# Patient Record
Sex: MALE | Race: WHITE | NOT HISPANIC OR LATINO | URBAN - METROPOLITAN AREA
[De-identification: names, ages, dates, MRNs, and addresses within clinical notes are randomized per-mention and may not be internally consistent; named-entity substitution may affect disease eponyms.]

---

## 2018-08-15 ENCOUNTER — APPOINTMENT (RX ONLY)
Dept: URBAN - METROPOLITAN AREA CLINIC 63 | Facility: CLINIC | Age: 83
Setting detail: DERMATOLOGY
End: 2018-08-15

## 2018-08-15 DIAGNOSIS — L57.0 ACTINIC KERATOSIS: ICD-10-CM

## 2018-08-15 DIAGNOSIS — L70.8 OTHER ACNE: ICD-10-CM

## 2018-08-15 DIAGNOSIS — L82.1 OTHER SEBORRHEIC KERATOSIS: ICD-10-CM

## 2018-08-15 PROCEDURE — 17000 DESTRUCT PREMALG LESION: CPT

## 2018-08-15 PROCEDURE — ? COUNSELING

## 2018-08-15 PROCEDURE — 99213 OFFICE O/P EST LOW 20 MIN: CPT | Mod: 25

## 2018-08-15 PROCEDURE — 17003 DESTRUCT PREMALG LES 2-14: CPT

## 2018-08-15 PROCEDURE — ? LIQUID NITROGEN

## 2018-08-15 ASSESSMENT — LOCATION DETAILED DESCRIPTION DERM
LOCATION DETAILED: 3RD WEB SPACE RIGHT HAND
LOCATION DETAILED: RIGHT ULNAR DORSAL HAND
LOCATION DETAILED: RIGHT RADIAL DORSAL HAND
LOCATION DETAILED: 3RD WEB SPACE LEFT HAND
LOCATION DETAILED: LEFT ULNAR DORSAL HAND
LOCATION DETAILED: RIGHT CENTRAL POSTAURICULAR SKIN
LOCATION DETAILED: RIGHT SUPERIOR MEDIAL UPPER BACK
LOCATION DETAILED: LEFT DORSAL WRIST

## 2018-08-15 ASSESSMENT — LOCATION SIMPLE DESCRIPTION DERM
LOCATION SIMPLE: RIGHT UPPER BACK
LOCATION SIMPLE: LEFT WRIST
LOCATION SIMPLE: SCALP
LOCATION SIMPLE: LEFT HAND
LOCATION SIMPLE: RIGHT HAND

## 2018-08-15 ASSESSMENT — LOCATION ZONE DERM
LOCATION ZONE: ARM
LOCATION ZONE: HAND
LOCATION ZONE: TRUNK
LOCATION ZONE: SCALP

## 2018-08-15 ASSESSMENT — PAIN INTENSITY VAS: HOW INTENSE IS YOUR PAIN 0 BEING NO PAIN, 10 BEING THE MOST SEVERE PAIN POSSIBLE?: NO PAIN

## 2024-10-04 ENCOUNTER — APPOINTMENT (RX ONLY)
Dept: URBAN - METROPOLITAN AREA CLINIC 63 | Facility: CLINIC | Age: 89
Setting detail: DERMATOLOGY
End: 2024-10-04

## 2024-10-04 DIAGNOSIS — D485 NEOPLASM OF UNCERTAIN BEHAVIOR OF SKIN: ICD-10-CM

## 2024-10-04 PROBLEM — D48.5 NEOPLASM OF UNCERTAIN BEHAVIOR OF SKIN: Status: ACTIVE | Noted: 2024-10-04

## 2024-10-04 PROCEDURE — ? COUNSELING

## 2024-10-04 PROCEDURE — 99203 OFFICE O/P NEW LOW 30 MIN: CPT

## 2024-10-04 PROCEDURE — ? TREATMENT REGIMEN

## 2024-10-04 ASSESSMENT — LOCATION DETAILED DESCRIPTION DERM: LOCATION DETAILED: LEFT CAVUM CONCHA

## 2024-10-04 ASSESSMENT — LOCATION ZONE DERM: LOCATION ZONE: EAR

## 2024-10-04 ASSESSMENT — LOCATION SIMPLE DESCRIPTION DERM: LOCATION SIMPLE: LEFT EAR

## 2024-10-04 NOTE — PROCEDURE: TREATMENT REGIMEN
Detail Level: Zone
Plan: Per Dr. Trejo, he needs to speak to Dr. Handley ENT about the lesion, due to is very deep inside the ear. Patient wanted to get Odomzo medication, but before Dr. Trejo can prescribe this medication he would like to see if Dr. Handley can do a biopsy or maybe send records. Called Dr. Handley office and left with Dr. Trejo number.

## 2025-04-23 ENCOUNTER — APPOINTMENT (OUTPATIENT)
Dept: URBAN - METROPOLITAN AREA CLINIC 63 | Facility: CLINIC | Age: OVER 89
Setting detail: DERMATOLOGY
End: 2025-04-23

## 2025-04-23 DIAGNOSIS — D485 NEOPLASM OF UNCERTAIN BEHAVIOR OF SKIN: ICD-10-CM

## 2025-04-23 PROBLEM — D48.5 NEOPLASM OF UNCERTAIN BEHAVIOR OF SKIN: Status: ACTIVE | Noted: 2025-04-23

## 2025-04-23 PROCEDURE — ? BIOPSY BY SHAVE METHOD

## 2025-04-23 PROCEDURE — 11102 TANGNTL BX SKIN SINGLE LES: CPT

## 2025-04-23 ASSESSMENT — LOCATION ZONE DERM: LOCATION ZONE: NECK

## 2025-04-23 ASSESSMENT — LOCATION DETAILED DESCRIPTION DERM: LOCATION DETAILED: LEFT CENTRAL LATERAL NECK

## 2025-04-23 ASSESSMENT — LOCATION SIMPLE DESCRIPTION DERM: LOCATION SIMPLE: NECK

## 2025-05-08 ENCOUNTER — APPOINTMENT (OUTPATIENT)
Dept: URBAN - METROPOLITAN AREA CLINIC 63 | Facility: CLINIC | Age: OVER 89
Setting detail: DERMATOLOGY
End: 2025-05-08

## 2025-05-08 DIAGNOSIS — D485 NEOPLASM OF UNCERTAIN BEHAVIOR OF SKIN: ICD-10-CM

## 2025-05-08 DIAGNOSIS — L57.0 ACTINIC KERATOSIS: ICD-10-CM

## 2025-05-08 PROBLEM — D48.5 NEOPLASM OF UNCERTAIN BEHAVIOR OF SKIN: Status: ACTIVE | Noted: 2025-05-08

## 2025-05-08 PROCEDURE — ? BIOPSY BY SHAVE METHOD

## 2025-05-08 PROCEDURE — 17000 DESTRUCT PREMALG LESION: CPT

## 2025-05-08 PROCEDURE — 17003 DESTRUCT PREMALG LES 2-14: CPT

## 2025-05-08 PROCEDURE — ? LIQUID NITROGEN

## 2025-05-08 PROCEDURE — 69100 BIOPSY OF EXTERNAL EAR: CPT | Mod: 59,LT

## 2025-05-08 ASSESSMENT — LOCATION DETAILED DESCRIPTION DERM
LOCATION DETAILED: LEFT INFERIOR POSTERIOR HELIX
LOCATION DETAILED: LEFT CENTRAL LATERAL NECK
LOCATION DETAILED: LEFT ANTITRAGUS
LOCATION DETAILED: LEFT SUPERIOR POSTERIOR HELIX

## 2025-05-08 ASSESSMENT — LOCATION SIMPLE DESCRIPTION DERM
LOCATION SIMPLE: NECK
LOCATION SIMPLE: LEFT EAR

## 2025-05-08 ASSESSMENT — LOCATION ZONE DERM
LOCATION ZONE: NECK
LOCATION ZONE: EAR

## 2025-05-08 NOTE — PROCEDURE: LIQUID NITROGEN
Render Note In Bullet Format When Appropriate: No
Consent: The patient's consent was obtained including but not limited to risks of crusting, scabbing, blistering, scarring, darker or lighter pigmentary change, recurrence, incomplete removal and infection.
Duration Of Freeze Thaw-Cycle (Seconds): 0
Number Of Freeze-Thaw Cycles: 1 freeze-thaw cycle
Show Aperture Variable?: Yes
done
Detail Level: Detailed
Post-Care Instructions: I reviewed with the patient in detail post-care instructions. Patient is to wear sunprotection, and avoid picking at any of the treated lesions. Pt may apply Vaseline to crusted or scabbing areas.